# Patient Record
Sex: FEMALE | Race: WHITE
[De-identification: names, ages, dates, MRNs, and addresses within clinical notes are randomized per-mention and may not be internally consistent; named-entity substitution may affect disease eponyms.]

---

## 2017-03-02 NOTE — HISTORY & PHYSICAL EXAMINATION
DATE OF ADMISSION:  2017

 

CHIEF COMPLAINT:  Leakage of fluid.

 

HISTORY OF PRESENT ILLNESS:  The patient is a 26-year-old G1, P0 at 35 weeks

and 3 days of gestation who felt a leakage of fluid from vagina at around

6:00 p.m. tonight and it has been coming as a gush and trickling since then.

The fluid has been clear.  No vaginal bleeding and no contractions, and she 
reports good

fetal movements.  She denies headaches, change in her vision, nausea,

vomiting, epigastric or right upper quadrant pain.  She denies fever, chills,

chest pain, or shortness of breath.  Her pregnancy has been complicated by:

1.  History of PCOS before pregnancy.  She was on metformin until 12 weeks

and then she stopped.

2.  Class 3 obesity.  She had baseline preeclamptic labs which were normal,

growth scan every 6-8 weeks.  Last growth scan was done on 17 and EFW 

was  27OO GRS. She was scheduled for NST b.i.d. after 36 weeks.

3.  Gestational diabetes.  She was started on glyburide; she is on 5 mg daily

now and fingersticks have been stable.

4.  Alcohol consumption during pregnancy.  She had 13 ounces of alcohol

intake around 3 weeks of pregnancy.  She had a fetal echo which was

normal.

 

PAST MEDICAL HISTORY:  As above, history of asthma, history of PCOS, history

of insulin resistance, and history of peanut allergy.

 

PAST SURGICAL HISTORY:  [place] teeth extraction and removal of gallbladder in

.

 

SOCIAL HISTORY:  The patient was a smoker and she quit in May of 2016.  She

used alcohol in first trimester as above.  She denies drug use.  She works as

an EMT in the ambulance.

 

MEDICATIONS:  Prenatal vitamins, glyburide 5 mg daily, calcium 500 mg daily,

Ventolin HFA as needed.

 

ALLERGIES:  AMOXICILLIN CAUSED SYNCOPE, AMPICILLIN CAUSED A RASH, LEVAQUIN 
CAUSED

HIVES AND A RASH, PEANUT OIL CAUSED ANAPHYLAXIS.

 

PRENATAL LABORATORIES:  Her blood type is A positive, antibody screen

negative, rubella positive, RPR nonreactive, hepatitis B surface antigen

negative, HIV negative.  BUN, creatinine, AST, ALT were normal in first

trimester.  H\T\H was 13/39, and 1 hour Glucola was elevated during the first

trimester and 3-hour OGGT was normal.  Repeat H\T\H was 12/35 and a 1 hour

Glucola was elevated and 3-hour OGGT was elevated.  GBS is unknown.

 

PHYSICAL EXAMINATION:

GENERAL:  The patient is alert, oriented x3.  She is comfortably sitting in

the bed.  No complaints.

VITAL SIGNS:  Stable, afebrile.

CARDIOVASCULAR SYSTEM:  S1, S2, RRR.

LUNGS:  Clear to auscultation bilaterally.

ABDOMEN:  Soft, gravid, [place] 6-1/2 to 7 pounds, morbidly obese.

EXTREMITIES:  Nontender, no edema.

PELVIC:  She is grossly ruptured, clear amniotic fluid.  Nitrazine positive. 

Cervix is 4 cm, 50%, minus 3, vertex.  Fetal heart rate 140s, not reactive

yet.  [place], no contractions.

 

ASSESSMENT AND PLAN:  The patient is a 26-year-old G1, P0 at 35 weeks and 3

days of gestation presenting with  premature rupture of membranes. 

Vital signs stable, afebrile.  Fetal heart rate reassuring.  GBS unknown. 

She is in early labor.  Plan is to admit, start IV fluids, IV insulin

management per pharmacy and vancomycin for unknown GBS.  I talked to the

patient about being  and the possible transfer of baby to [facility] where NICU available.  She understands.

 I also talked to the pediatrician on call.  She is aware and she plans to be 
in for delivery.

 

 

 

JOJO

## 2017-03-03 NOTE — ANESTHESIA PROCEDURE NOTE
Anesthesia Epidural Removal Nt


Date & Time


Mar 3, 2017 at 11:24





Vital Signs


Pain Intensity:  0.0





Notes


Mental Status:  alert / awake / arousable, participated in evaluation


Nausea / Vomiting:  adequately controlled


Pain:  adequately controlled


Airway Patency, RR, SpO2:  stable & adequate


BP & HR:  stable & adequate


Hydration State:  stable & adequate


Neuraxial Anesthesia:  was administered, sensory block is resolved


Anesthetic Complications:  no major complications apparent, pt satisfied with 

anesthetic care


Epidural:  removed without complications, with tip intact

## 2017-03-03 NOTE — PROGRESS NOTE
Progress Note


Date of Service


Mar 3, 2017.





Progress Note


Delivery Note


 live female over intact perineum ALIE with Apgars 8/9 birth weight pending.

  Cord blood obtained for cord collection.  Placenta delivered spontaneously 

and intact.  No tears.   ml.  Final sponge and needle instrument count 

are correct.  Mom and baby stable.

## 2017-03-03 NOTE — PROGRESS NOTE
Progress Note


Date of Service


Mar 3, 2017.





Progress Note


Asked to redose epidural for pain. Pt having perineal pain w/ contractions. I 

informed pt that this is sometimes difficult to treat. On exam, pt had level 

around T8 b/l. I sat pt upright and gave 5 mL 0.25% bupivicaine and 100 mcg 

fentanyl in divided doses via epidural catheter from [id number]. Pt now feeling 

"much better". Vitals stable. Fetal heart tones unchanged.

## 2017-03-04 NOTE — OB/GYN PROGRESS NOTE
OB/GYN Progress Note


Date of Service


Mar 4, 2017.





Subjective


conversation w/ patient, physical exam


Ambulation:  ambulating normally


Voiding:  no voiding problems


Passing Gas:  Yes


Diet Tolerance:  Regular Diet


Lochia:  Moderate


Feeding Type:  Breast Feeding





Review of Systems


Constitutional:  No chills, No fatigue, No fever, No problem reported, No sweats

, No weakness, No weight loss


Respiratory:  No cough, No dyspnea at rest, No dyspnea on exertion, No 

hemoptysis, No problem reported, No shortness of breath, No sputum, No wheezing


Cardiac:  No PND, No chest pain, No claudication, No edema, No orthopnea, No 

palpitations, No problem reported


Breast:  No breast lump, No breast pain, No change in shape, No nipple discharge

, No problem reported, No see HPI


Abdomen:  No GI bleeding, No constipation, No diarrhea, No nausea, No pain, No 

problem reported, No vomiting


Female :  No abnormal vaginal bleeding, No dysuria, No hematuria, No 

incontinence, No problem reported, No see HPI, No urinary frequency, No vaginal 

discharge


VD day 31


pt doing well


anticipate disch tomorrow





Objective


Vital Signs











  Date Time  Temp Pulse Resp B/P Pulse Ox O2 Delivery O2 Flow Rate FiO2


 


3/4/17 07:30 36.7 96 18 112/75  Room Air  


 


3/4/17 07:30      Room Air  


 


3/4/17 05:00 37.1 97 18 104/65  Room Air  


 


3/4/17 00:20      Room Air  


 


3/4/17 00:20 36.7 87 18 112/75  Room Air  


 


3/3/17 20:10 36.9 87 20 132/84  Room Air  


 


3/3/17 15:39 36.8 92 16 123/80 98 Room Air  


 


3/3/17 15:30     97 Room Air  


 


3/3/17 15:30 36.4 85 18 127/82 99 Room Air  


 


3/3/17 14:00      Room Air  











Laboratory Results





Last 24 Hours








Test


  3/4/17


06:19


 


Hemoglobin 12.0 g/dL 


 


Hematocrit 34.5 %

## 2017-03-05 NOTE — OB/GYN PROGRESS NOTE
OB/GYN Progress Note


Date of Service


Mar 5, 2017.





Subjective


conversation w/ patient, physical exam


Ambulation:  ambulating normally


Voiding:  no voiding problems


Passing Gas:  Yes


Diet Tolerance:  Regular Diet


Lochia:  Moderate


Feeding Type:  Breast Feeding





Review of Systems


Constitutional:  No chills, No fatigue, No fever, No problem reported, No sweats

, No weakness, No weight loss


Respiratory:  No cough, No dyspnea at rest, No dyspnea on exertion, No 

hemoptysis, No problem reported, No shortness of breath, No sputum, No wheezing


Cardiac:  No PND, No chest pain, No claudication, No edema, No orthopnea, No 

palpitations, No problem reported


Breast:  No breast lump, No breast pain, No change in shape, No nipple discharge

, No problem reported, No see HPI


Abdomen:  No GI bleeding, No constipation, No diarrhea, No nausea, No pain, No 

problem reported, No vomiting


Female :  No abnormal vaginal bleeding, No dysuria, No hematuria, No 

incontinence, No problem reported, No see HPI, No urinary frequency, No vaginal 

discharge


VD day #3


pt doing well


no complaints


d/c home with instructions





Objective


Vital Signs











  Date Time  Temp Pulse Resp B/P Pulse Ox O2 Delivery O2 Flow Rate FiO2


 


3/5/17 00:05 36.3 84 20 128/83  Room Air  


 


3/5/17 00:05      Room Air  


 


3/4/17 16:00 36.8 97 18 115/77  Room Air  


 


3/4/17 16:00      Room Air  


 


3/4/17 07:30 36.7 96 18 112/75  Room Air  


 


3/4/17 07:30      Room Air

## 2017-03-05 NOTE — DISCHARGE INSTRUCTIONS
Discharge Instructions


Admission


Reason for Admission:  Check Rupture





Discharge


Discharge Diagnosis / Problem:  postpartum





Discharge Goals


Goal(s):  Routine recovery after delivery





Activity Recommendations


Activity Limitations:  as noted below





ACTIVITY RECOMMENDATIONS:





* Gradual return to full activity over the next 2-3 weeks.


* No lifting - nothing heavier than baby over the next 2-3 weeks.


* Do not engage in vigorous exercise, sexual activity or sports until cleared 

by 


   your physician.


* Do not drive or operate any motorized equipment until cleared by your 

physician.


* You may shower/bathe daily.








BREAST CARE:





If you are not breast feeding:





*  Wear a supportive bra 24 hours a day for one to two weeks.


*  Avoid stimulating your breasts and nipples as much as possible during the


    first few weeks after delivery.


*  When taking a shower, have the warm water hit your back, not breasts.


*  When your breasts feel full, apply ice packs.  Usually three to four times 


    a day helps ease the discomfort.


*  Take a mild pain medication (Tylenol/Motrin) when you are uncomfortable.





If breast feeding:





*  Use breast milk to lubricate nipples.  Lansinoh cream may be used for sore 

nipples. 


    You do not need to remove cream prior to breast feeding.  If using a 

different 


    brand of cream, check the label for directions regarding removal of cream 

prior 


    to nursing.


*  Wear a supportive bra.


*  If having problems with breasts or breast feeding, call a lactation 

consultant or 


    your health care provider.








EPISIOTOMY CARE:





After delivery, if you have an episiotomy (stitches), the following steps will 


ease discomfort and aid healing.





*   For the first 24 hours after delivery, place ice packs next to your 

episiotomy 


    to help reduce swelling.


*  After the first 24 hour-period, sitz baths, either portable or in the tub, 

are 


    suggested.  A shower with a shower arm sprayed over the episiotomy may 


    be comforting.


*  Clarisa care should be done after each voiding and bowel movement.  Squirt 


    warm water from a plastic bottle over the perineum (region of the body 


    between the anus and urinary opening) and pat dry.


*  Use Dermoplast to ease discomfort.  Shake container.  Spray directly over


    the episiotomy.  


*  Place a Tucks on a clean sanitary pad next to your episiotomy.








OVER THE COUNTER MEDICATION:





*  For discomfort or pain, you may use Acetaminophen (Tylenol), Ibuprofen (Advil

), or 


    Naproxen (Aleve) following the package directions. 


*  For constipation you may use Colace following the package directions.








SPECIAL CARE INSTRUCTIONS:





When you are discharged from the hospital, it is important for you to follow 


the instructions listed below:





*  During the first week at home, you should be able to care for yourself and


    your baby.  In addition, the usual light household activities are 

encouraged.





*  Limit your activities to the way you feel.  Do not try to clean the house or 


    move furniture.  Be sensible.





*  If you actively engage in sports and have done so up until the time of your


   delivery, you may resume these activities as soon as you feel able.  This may


   take up to one month or even longer.  Use good judgment.





*  Continue to take your prenatal vitamins for at least six weeks after the 

birth


    of your baby.





*  Your diet need not be limited unless you were on a special diet before your 


    delivery.  Breast-feeding mothers need around 2500 calories per day and at


    least 64-80 ounces of fluid per day (8 to 10 glasses).





*  You should eat foods from the four major food groups.  Crash diets or fad 

diets


    are to be avoided.  Eating lean meats, fresh fruits and vegetables, low-fat 


    dairy products, high fiber foods and a regular exercise program, will help 

you 


    get back to your pre-pregnancy weight without putting your health at risk.





*  Constipation is sometimes a problem after delivery.  Take a mild laxative as 


    needed.  If breast feeding, Milk of Magnesia is acceptable to use. You may 


    use a suppository or Fleets enema if no episiotomy.





*  A daily shower or tub bath is suggested.  Be sure to thoroughly and gently 


    dry the perineum.





*  A bloody vaginal discharge will usually continue until around four weeks post


    partum.  A small amount of bleeding may continue for as long as six weeks.  


    Vaginal discharge changes from the bright red bleeding after delivery to 

pink 


    then brownish and finally yellowish-pink before becoming white and 

disappearing.





*  Bleeding may increase with activity.  Your first period may come in 4-8 

weeks.


    If you are breast feeding, your period may be delayed even longer.





*  [place] (sex) can begin whenever both you and your partner feel 

comfortable


    and do not have any form of genital infection.  It is recommended that you 

wait


    until after your return postpartum appointment and discuss with your 

physician.


    If you have questions, please talk to your health care practitioner.  A 

condom 


    should be used to prevent infection and pregnancy.





*  Foreplay, gentle intercourse and lubrication is very important the first 

several 


    times to prevent pain.  A water-based lubricant such as K-Y jelly or 

Astroglide 


    may be used.





*  Tampons may be used six weeks after delivery.





*  Douching should be avoided for 6 weeks after delivery.





*  If you have RH negative blood and your baby is RH positive, you will receive 


    RHOGAM by injection prior to discharge.  The nurse will give you a card to 


    keep with you that has the date and place that you received RHOGAM after 


    delivery.





*  During your prenatal care, you had a Rubella screen done to check for the 


    presence of rubella antibodies in your blood.  If your test was negative, 

you


    will receive a Rubella vaccine prior to discharge.  This vaccine may cause 

a 


    fever, soreness at the injection site and flu-like symptoms.  If these 

symptoms


    persist, notify your health care practitioner.   Pregnancy is not advised 

for 


    three months after a Rubella vaccine.  There is a higher chance of having a 


    baby with birth defects if conceived within three months of getting the 

vaccine.





*  If you were discharged 24 hours from delivery or before 48 hours: Visiting 

nurses 


    will come to your home 48 hours after discharge to assess you and your 

baby.  


    The visiting nurse will meet with you while you are in the hospital to 

arrange a 


    time and get directions to your home.





*  Verbalizes understanding of car seat law as reviewed with patient nursing.





*  Car Seat hand-out given and reviewed with patient by nursing.





*  Shaken baby information reviewed with patient by nursing.


 





Call you doctor if:





*  Heavy bleeding (saturating several pads an hour) or passing clots the size 

of 


    your fist.


*  A fever >101 degrees F (38.3 degrees C) on two occasions four hours apart 


    and/or chills.


*  Unusual pain in the pelvic or vaginal areas.


*  "Baby Blues" lasting longer than two weeks.





If you have any questions or concerns, call your health care practitioner 


at [phone].








FOLLOW-UP VISIT:





*  Please call the office at [phone] to schedule a 6 week postpartum


    examination.  It is important you keep this appointment.  


*  It is important for you to make arrangements for either yearly or twice 


    yearly check-ups thereafter.











.





Current Hospital Diet


Patient's current hospital diet: Regular OB Diet





Discharge Diet


Recommended Diet:  Regular Diet





Pending Studies


Studies pending at discharge:  no





Medical Emergencies








.


Who to Call and When:





Medical Emergencies:  If at any time you feel your situation is an emergency, 

please call 911 immediately.





.





Non-Emergent Contact


Non-Emergency issues call your:  Specialist





.


.





"Provider Documentation" section prepared by Bro High.





VTE Core Measure


Inpt VTE Proph given/why not?:  Treatment not indicated

## 2017-07-28 NOTE — EMERGENCY ROOM VISIT NOTE
History


First contact with patient:  14:45


Chief Complaint:  SYNCOPE


Stated Complaint:  SYNCOPE


Nursing Triage Summary:  


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 susana was in a pateints 


house and became "hot, dizzy and lightheaded." patient walked outside and put 


arms on a banister and laid head on arms. "That's the last thing I remember." 

pt 


had syncopal episode ground level. +LOC for approximately 10-15 seconds. 





 prehospital susana received 4mg zofran sublingual 











pt is 4-5 months post partum. patient states she got period on  but period 


was 10 days late. pt unsure if she is pregnant at this time. 











patient states she continues to  feel dizzy when she stands.





History of Present Illness


The patient is a 27 year old female who presents to the Emergency Room via EMS 

with complaints of syncopal episode.  The patient states that she was at the 

scene of an EMS call and she was in a house and got very very hot.  She then 

started feeling nauseated.  She stepped outside and felt lightheaded so she 

leaned against a railing.  The patient states the next thing she knows was 

someone was calling her name.  She was laying on the ground.  A  

that was at the scene stated that they saw her fall backwards.  The patient 

does not know if she hit her head.  The patient does admit to a headache but 

denies any visual changes dizziness.  She still complains of nausea but denies 

any vomiting.  The patient is also complaining of pain at the lower portion of 

her sternum on palpation.  The patient denies any chest pain or shortness of 

breath.  The patient denies any abdominal pain or any neck pain.  The patient 

denies any back pain or extremity pain.  The patient states there is a chance 

she could be pregnant.  She states that urine pregnancy test do not work on 

her.  The patient does admit to passing out on several occasions in the past .  

She states that she has had to vagal syncope episodes when she was 14 and 20.  

She also had a syncopal episode after taking amoxicillin approximately one and 

a half years ago.





Review of Systems


10 system review was performed and was negative unless stated otherwise history 

of present illness.





Past Medical/Surgical History


Medical Problems:


(1) Asthma, Unspecified


(2) CAP (community acquired pneumonia)


(3) CAP (community acquired pneumonia)


(4) Cholelithiasis without obstruction


(5) PCOS (polycystic ovarian syndrome)


(6) Pre-diabetes


(7)  premature rupture of membranes in third trimester


(8) URIC ACID NEPHROLITHIAS


Surgical Problems:


(1) History of cholecystectomy


(2) History of wisdom tooth extraction








Family History





Diabetes mellitus


FH: cancer


FH: diabetes mellitus


FH: gallbladder disease


FH: heart disease


FH: hypertension


FH: kidney disease


FH: lung disease





Social History


Smoking Status:  Former Smoker


Alcohol Use:  occasionally


Drug Use:  none


Marital Status:  in relationship


Housing Status:  lives with family


Occupation Status:  employed





Current/Historical Medications


Scheduled


Metformin Hcl (Glucophage), 500 MG PO DAILY





Allergies


Coded Allergies:  


     Amoxicillin (Verified  Allergy, Severe, ELAVATED BP/PASSED OUT, 3/2/17)


     Levofloxacin (Verified  Allergy, Intermediate, Rash, 3/2/17)


     Ampicillin (Verified  Allergy, Mild, RASH, 3/2/17)


 noted 16


     Sulbactam (Verified  Allergy, Mild, RASH, 3/2/17)


 noted 16


     Peanut (Verified  Allergy, Unknown, ., 3/2/17)





Physical Exam


Vital Signs











  Date Time  Temp Pulse Resp B/P (MAP) Pulse Ox O2 Delivery O2 Flow Rate FiO2


 


17 17:43  95 20 121/63 99 Room Air  


 


17 15:30 36.7 103 18 103/64 97 Room Air  


 


17 15:20  103      


 


17 15:15  94  129/76    





  105  103/66    


 


17 14:29 36.7 99 18 124/73 97 Room Air  











Physical Exam


GENERAL: Obese 27-year-old female appears in no acute distress.


MENTAL Status: Alert and oriented 3.


HEAD: Atraumatic, nontender to palpation.


EYES: PERRLA.  EOMs intact.


EARS: Canals clear.  TMs without hemotympanum


NECK: Supple, no lymphadenopathy noted.  No carotid bruits noted.


LUNGS: Clear auscultation without wheezes rales or rhonchi.


CARDIAC: Regular rate and rhythm without murmur.  Pulses is full and equal 

throughout.


CHEST WALL: The patient is nontender to palpation over the ribs.  She does have 

tenderness palpation over the lower sternum.  No bony abnormality noted.


ABDOMEN: Positive bowel sounds all 4 quadrants.  Soft, nontender to palpation 

without organomegaly or masses.


SPINE: Entire spine nontender to palpation.


MUSCULOSKELETAL: Patient is able to move her arms and legs without difficulty.


NEURO:Cranial nerves two through 12 intact. Cerebellar function intact with 

finger-to-nose. Fine motor intact with alternating finger motions.





Medical Decision & Procedures


ER Provider


Diagnostic Interpretation:


CHEST 2 VIEWS ROUTINE, STERNUM MIN 2 VIEWS





HISTORY:  27 years-old Female syncope 





COMPARISON: Chest radiograph 2016





TECHNIQUE: 2 views of the chest with 3 views of the sternum





FINDINGS: 





CHEST:


Cardiomediastinal and hilar silhouettes are within normal limits. There is no


pneumothorax or pleural effusion. There is resolution of the previously


described nodular right upper lobe airspace opacities.





The bones appear to be intact grossly. Surgical clips in the upper abdomen


suggest prior cholecystectomy.





STERNUM:


On image 3 of 3, there is apparent slight cortical step-off of the inferior


sternum anteriorly near the xiphoid process. The remainder of the sternum


appears unremarkable.





IMPRESSION: 


1. No acute cardiopulmonary process. No pneumothorax.


2. Slight cortical step off of the inferior most sternal cortex anteriorly





CHEST 2 VIEWS ROUTINE, STERNUM MIN 2 VIEWS





HISTORY:  27 years-old Female syncope 





COMPARISON: Chest radiograph 2016





TECHNIQUE: 2 views of the chest with 3 views of the sternum





FINDINGS: 





CHEST:


Cardiomediastinal and hilar silhouettes are within normal limits. There is no


pneumothorax or pleural effusion. There is resolution of the previously


described nodular right upper lobe airspace opacities.





The bones appear to be intact grossly. Surgical clips in the upper abdomen


suggest prior cholecystectomy.





STERNUM:


On image 3 of 3, there is apparent slight cortical step-off of the inferior


sternum anteriorly near the xiphoid process. The remainder of the sternum


appears unremarkable.





IMPRESSION: 


1. No acute cardiopulmonary process. No pneumothorax.


2. Slight cortical step off of the inferior most sternal cortex anteriorly





HEAD WITHOUT CONTRAST (CT)





CLINICAL HISTORY: 27 years-old Female with syncope/headache.       





TECHNIQUE: Multiple axial CT images of the head were obtained without contrast. 


A dose lowering technique was utilized adhering to the principles of ALARA.





CT DOSE:  720.95 mGycm





COMPARISON: 2015.





FINDINGS: 





No acute intracranial hemorrhage, midline shift, mass, large territorial


ischemia or abnormal extra-axial collection. Brain parenchyma appears normal. 





The calvarium is intact.  The paranasal sinuses, mastoid air cells, and middle


ear cavities are clear.





IMPRESSION:  No acute intracranial abnormality.











The above report was generated using voice recognition software. It may contain


grammatical, syntax or spelling errors.





Laboratory Results


17 15:15








Red Blood Count 4.77, Mean Corpuscular Volume 88.1, Mean Corpuscular Hemoglobin 

30.6, Mean Corpuscular Hemoglobin Concent 34.8, Mean Platelet Volume 11.2, 

Neutrophils (%) (Auto) 83.1, Lymphocytes (%) (Auto) 9.6, Monocytes (%) (Auto) 

5.4, Eosinophils (%) (Auto) 1.6, Basophils (%) (Auto) 0.1, Neutrophils # (Auto) 

7.89, Lymphocytes # (Auto) 0.91, Monocytes # (Auto) 0.51, Eosinophils # (Auto) 

0.15, Basophils # (Auto) 0.01





17 15:15

















Test


  17


15:15


 


White Blood Count


  9.49 K/uL


(4.8-10.8)


 


Red Blood Count


  4.77 M/uL


(4.2-5.4)


 


Hemoglobin


  14.6 g/dL


(12.0-16.0)


 


Hematocrit 42.0 % (37-47) 


 


Mean Corpuscular Volume


  88.1 fL


()


 


Mean Corpuscular Hemoglobin


  30.6 pg


(25-34)


 


Mean Corpuscular Hemoglobin


Concent 34.8 g/dl


(32-36)


 


Platelet Count


  219 K/uL


(130-400)


 


Mean Platelet Volume


  11.2 fL


(7.4-10.4)


 


Neutrophils (%) (Auto) 83.1 % 


 


Lymphocytes (%) (Auto) 9.6 % 


 


Monocytes (%) (Auto) 5.4 % 


 


Eosinophils (%) (Auto) 1.6 % 


 


Basophils (%) (Auto) 0.1 % 


 


Neutrophils # (Auto)


  7.89 K/uL


(1.4-6.5)


 


Lymphocytes # (Auto)


  0.91 K/uL


(1.2-3.4)


 


Monocytes # (Auto)


  0.51 K/uL


(0.11-0.59)


 


Eosinophils # (Auto)


  0.15 K/uL


(0-0.5)


 


Basophils # (Auto)


  0.01 K/uL


(0-0.2)


 


RDW Standard Deviation


  38.9 fL


(36.4-46.3)


 


RDW Coefficient of Variation


  12.2 %


(11.5-14.5)


 


Immature Granulocyte % (Auto) 0.2 % 


 


Immature Granulocyte # (Auto)


  0.02 K/uL


(0.00-0.02)


 


Prothrombin Time


  10.2 SECONDS


(9.0-12.0)


 


Prothromb Time International


Ratio 1.0 (0.9-1.1) 


 


 


Activated Partial


Thromboplast Time 22.4 SECONDS


(21.0-31.0)


 


Partial Thromboplastin Ratio 0.9 


 


Anion Gap


  8.0 mmol/L


(3-11)


 


Est Creatinine Clear Calc


Drug Dose 140.0 ml/min 


 


 


Estimated GFR (


American) 96.5 


 


 


Estimated GFR (Non-


American 83.2 


 


 


BUN/Creatinine Ratio 17.9 (10-20) 


 


Calcium Level


  9.2 mg/dl


(8.5-10.1)


 


Human Chorionic Gonadotropin,


Qual NEG (NEG) 


 











Medications Administered











 Medications


  (Trade)  Dose


 Ordered  Sig/Mary


 Route  Start Time


 Stop Time Status Last Admin


Dose Admin


 


 Sodium Chloride  1,000 ml @ 


 999 mls/hr  Q1H1M STAT


 IV  17 14:55


 17 15:55 DC 17 15:21


999 MLS/HR


 


 Ondansetron HCl


  (Zofran Inj)  4 mg  NOW  STAT


 IV  17 14:55


 17 14:59 DC 17 15:20


4 MG


 


 Acetaminophen


  (Tylenol Tab)  1,000 mg  NOW  STAT


 PO  17 14:55


 17 14:59 DC 17 15:21


1,000 MG











ECG


Indication:  syncope


Rhythm:  normal sinus


Findings:  no acute ischemic change





ED Course


The patient was evaluated.  The patient's EMR medication list were reviewed.  

IV access was obtained.  She was placed on a monitor.  EKG was ordered 

interpreted as above without any acute findings..  The patient was given 1 L 

normal saline wide-open.  She was given Tylenol 1 g by mouth for headache and 

Zofran 4 mg IV push for nausea.  CBC and differential, renal profile, coags, 

beta hCG was ordered.  Labs are reviewed and were unremarkable.  The nurse 

started to do the patient's orthostatic vitals.  When she was lying down her 

blood pressure was 129/76.  When she sat the patient up her blood pressure was 

103/66 and the patient felt very dizzy therefore the nurse did not stand her up 

to get standing vitals.  The patient will receive a second liter of saline wide-

open.  CT of the head was ordered and interpreted by the radiologist as above 

any acute findings.  X-ray of the sternum and chest were ordered and 

interpreted by the radiologist and myself as above with a small cortical step-

off consistent with cortical fracture.  The patient was able to get up and walk 

without feeling dizzy after receiving the 2 L of saline.  She still was 

complaining of a headache.  I offered her additional pain medication but she 

declined.  The patient was discharged home in stable condition.





Medical Decision


Differential diagnosis include vasovagal syncope, dehydration, orthostatic 

hypotension, brain neoplasm,, cardiac arrhythmia





Impression





 Primary Impression:  


 Syncope


 Additional Impressions:  


 Orthostatic hypotension


 Sternal fracture





Departure Information


Dispostion


Home / Self-Care





Condition


GOOD





Referrals


No Doctor, Assigned (PCP)





Forms


HOME CARE DOCUMENTATION FORM,                                                 

               IMPORTANT VISIT INFORMATION





Patient Instructions


My [facility], Syncope





Additional Instructions





Stay well hydrated especially when it is very hot and humid.  Follow-up with 

your family doctor next week for reevaluation.  If you have any recurrent 

episodes return to ER immediately.  Tylenol as needed for headache.





Problem Qualifiers








 Primary Impression:  


 Syncope


 Syncope type:  heat syncope  Encounter type:  initial encounter  Qualified 

Codes:  T67.1XXA - Heat syncope, initial encounter


 Additional Impressions:  


 Sternal fracture


 Encounter type:  initial encounter  Sternal location:  body of sternum  

Fracture type:  closed  Qualified Codes:  S22.22XA - Fracture of body of sternum

, initial encounter for closed fracture

## 2017-07-28 NOTE — DIAGNOSTIC IMAGING REPORT
CHEST 2 VIEWS ROUTINE, STERNUM MIN 2 VIEWS



HISTORY:  27 years-old Female syncope 



COMPARISON: Chest radiograph 1/31/2016



TECHNIQUE: 2 views of the chest with 3 views of the sternum



FINDINGS: 



CHEST:

Cardiomediastinal and hilar silhouettes are within normal limits. There is no

pneumothorax or pleural effusion. There is resolution of the previously

described nodular right upper lobe airspace opacities.



The bones appear to be intact grossly. Surgical clips in the upper abdomen

suggest prior cholecystectomy.



STERNUM:

On image 3 of 3, there is apparent slight cortical step-off of the inferior

sternum anteriorly near the xiphoid process. The remainder of the sternum

appears unremarkable.



IMPRESSION: 

1. No acute cardiopulmonary process. No pneumothorax.

2. Slight cortical step off of the inferior most sternal cortex anteriorly

suspicious for acute fracture near the xiphoid process. Correlate with point

tenderness. 







The above report was generated using voice recognition software. It may contain

grammatical, syntax or spelling errors.







Electronically signed by:  García Lamas M.D.

7/28/2017 5:30 PM



Dictated Date/Time:  7/28/2017 5:25 PM

## 2017-07-28 NOTE — DIAGNOSTIC IMAGING REPORT
HEAD WITHOUT CONTRAST (CT)



CLINICAL HISTORY: 27 years-old Female with syncope/headache.       



TECHNIQUE: Multiple axial CT images of the head were obtained without contrast. 

A dose lowering technique was utilized adhering to the principles of ALARA.



CT DOSE:  720.95 mGycm



COMPARISON: 12/19/2015.



FINDINGS: 



No acute intracranial hemorrhage, midline shift, mass, large territorial

ischemia or abnormal extra-axial collection. Brain parenchyma appears normal. 



The calvarium is intact.  The paranasal sinuses, mastoid air cells, and middle

ear cavities are clear.



IMPRESSION:  No acute intracranial abnormality.







The above report was generated using voice recognition software. It may contain

grammatical, syntax or spelling errors.







Electronically signed by:  García Lamas M.D.

7/28/2017 5:02 PM



Dictated Date/Time:  7/28/2017 5:01 PM

## 2017-07-29 NOTE — EMERGENCY ROOM VISIT NOTE
History


Report prepared by Ivis:  Christianne Benavidez


Under the Supervision of:  Dr. Paul Vasquez D.O.


First contact with patient:  17:31


Chief Complaint:  ABDOMINAL PAIN


Stated Complaint:  RIGHT AB PAIN


Nursing Triage Summary:  


 Nausea, vomiting and abd pain (RLQ).  





hx of cholecystectomy





History of Present Illness


The patient is a 27 year old female who presents to the Emergency Room with 

complaints of intermittent abdominal pain starting this morning. She was seen 

in the ED yesterday after an episode of syncope. She did not have any abdominal 

pain at that time. The pain goes from her RLQ to the LUQ. She describes the 

pain as sharp and stabbing. She gets nauseous with the pain. She took Tylenol 

today at 1230 to no significant relief. She denies any back pain, rash, fever, 

or other symptoms. She is unsure if she has hematuria due to being on her 

period. Her current period was 10 days late and abnormal at first. She has a 

history of PCOS and insulin resistance. She has had kidney stones in the past, 

but states that her current pain is dissimilar. She has had a cholecystectomy. 

She still has her appendix.





   Source of History:  patient


   Onset:  this morning


   Position:  abdomen (RLQ, LUQ)


   Quality:  sharp, stabbing


   Timing:  intermittent


   Associated Symptoms:  + nausea, No fevers, No back pain, No rash





Review of Systems


See HPI for pertinent positives & negatives. A total of 10 systems reviewed and 

were otherwise negative.





Past Medical & Surgical


Medical Problems:


(1) Asthma, Unspecified


(2) CAP (community acquired pneumonia)


(3) CAP (community acquired pneumonia)


(4) Cholelithiasis without obstruction


(5) PCOS (polycystic ovarian syndrome)


(6) Pre-diabetes


(7)  premature rupture of membranes in third trimester


(8) URIC ACID NEPHROLITHIAS


Surgical Problems:


(1) History of cholecystectomy


(2) History of wisdom tooth extraction








Family History





Diabetes mellitus


FH: cancer


FH: diabetes mellitus


FH: gallbladder disease


FH: heart disease


FH: hypertension


FH: kidney disease


FH: lung disease





Social History


Smoking Status:  Former Smoker


Alcohol Use:  occasionally


Drug Use:  none


Marital Status:  in relationship


Housing Status:  lives with family


Occupation Status:  employed





Current/Historical Medications


Scheduled


Metformin Hcl (Glucophage), 500 MG PO DAILY





Allergies


Coded Allergies:  


     Amoxicillin (Verified  Allergy, Severe, ELAVATED BP/PASSED OUT, 3/2/17)


     Levofloxacin (Verified  Allergy, Intermediate, Rash, 3/2/17)


     Ampicillin (Verified  Allergy, Mild, RASH, 3/2/17)


 noted 16


     Sulbactam (Verified  Allergy, Mild, RASH, 3/2/17)


 noted 16


     Flu Virus Vaccine (Verified  Allergy, Unknown, Unknown, 17)


     Peanut (Verified  Allergy, Unknown, ., 3/2/17)





Physical Exam


Vital Signs











  Date Time  Temp Pulse Resp B/P (MAP) Pulse Ox O2 Delivery O2 Flow Rate FiO2


 


17 19:38  75 18 129/75 98 Room Air  


 


17 19:12  88      


 


17 18:43  88 16 131/75 99 Room Air  


 


17 16:50 37.0 95 16 111/78 98 Room Air  











Physical Exam


GENERAL:  Patient is awake, alert, and in no acute distress. Patient is resting 

comfortably and showing no signs of anxiety


EYES: The conjunctivae are clear.  The pupils are round and reactive. 


EARS, NOSE, MOUTH AND THROAT: The nose is without any evidence of any 

deformity. Mucous membranes are moist tongue is midline 


NECK: The neck is nontender and supple.


RESPIRATORY: Normal respiratory effort is noted there is no evidence of 

wheezing rhonchi or rales


CARDIOVASCULAR:  Regular rate and rhythm noted there no murmurs rubs or gallops 

normal S1 normal S2 


GASTROINTESTINAL: The abdomen is mildly distended but soft, right sided 

tenderness to palpation, no guarding or rigidity. 


MUSCULOSKELETAL/EXTREMITIES: There is no evidence of gross deformity full range 

of motion is noted in the hips and shoulders


SKIN: There is no obvious evidence of any rash. There are no petechiae, pallor 

or cyanosis noted. 


NEUROLOGIC:  Patient is awake alert and oriented x3





Medical Decision & Procedures


ER Provider


Diagnostic Interpretation:


Radiology results as stated below per my review and radiologist interpretation:





CT OF THE ABDOMEN AND PELVIS WITHOUT CONTRAST





CLINICAL HISTORY: Right-sided abdominal pain.    





COMPARISON STUDY:  CT of the abdomen and pelvis 2012 and renal


ultrasound 2016. 





TECHNIQUE: Axial images of the abdomen and pelvis were obtained without IV


contrast. Images were reviewed in the axial, sagittal, and coronal planes.  A


dose lowering technique was utilized adhering to the principles of ALARA.








FINDINGS: No pneumatosis, free air or portal venous gas is present. There is no


biliary ductal dilatation status post cholecystectomy. There is probable fatty


infiltration of the liver. A splenule is present. Unenhanced images of the


spleen, adrenal glands and pancreas are unremarkable. Mild mesenteric


infiltration is unchanged since prior CT. No renal, ureteral or bladder calculi


are present. There is no hydronephrosis. The caliber of small and large bowel


are normal. The appendix is normal. There is no ascites or lymphadenopathy.


Prominent mesenteric lymph nodes are unchanged. These are benign. No suspicious


skeletal lesions are identified.





IMPRESSION:  





1. No urinary calculi or hydronephrosis.





2. No acute process within the abdomen or pelvis on unenhanced exam. Normal


appendix.





Electronically signed by:  Torrey Mohr M.D.


2017 6:56 PM





Dictated Date/Time:  2017 6:49 PM





Laboratory Results


17 18:23








Red Blood Count 4.04, Mean Corpuscular Volume 88.9, Mean Corpuscular Hemoglobin 

30.9, Mean Corpuscular Hemoglobin Concent 34.8, Mean Platelet Volume 10.7, 

Neutrophils (%) (Auto) 64.1, Lymphocytes (%) (Auto) 27.4, Monocytes (%) (Auto) 

4.8, Eosinophils (%) (Auto) 3.3, Basophils (%) (Auto) 0.2, Neutrophils # (Auto) 

2.69, Lymphocytes # (Auto) 1.15, Monocytes # (Auto) 0.20, Eosinophils # (Auto) 

0.14, Basophils # (Auto) 0.01





17 17:40

















Test


  17


17:30 17


17:40 17


18:23


 


Urine Color YELLOW   


 


Urine Appearance CLEAR (CLEAR)   


 


Urine pH 5.5 (4.5-7.5)   


 


Urine Specific Gravity


  1.014


(1.000-1.030) 


  


 


 


Urine Protein NEG (NEG)   


 


Urine Glucose (UA) NEG (NEG)   


 


Urine Ketones NEG (NEG)   


 


Urine Occult Blood 1+ (NEG)   


 


Urine Nitrite NEG (NEG)   


 


Urine Bilirubin NEG (NEG)   


 


Urine Urobilinogen NEG (NEG)   


 


Urine Leukocyte Esterase TRACE (NEG)   


 


Urine WBC (Auto) 1-5 /hpf (0-5)   


 


Urine RBC (Auto) 0-4 /hpf (0-4)   


 


Urine Hyaline Casts (Auto) 1-5 /lpf (0-5)   


 


Urine Epithelial Cells (Auto)


  20-30 /lpf


(0-5) 


  


 


 


Urine Bacteria (Auto) NEG (NEG)   


 


Anion Gap


  


  3.0 mmol/L


(3-11) 


 


 


Est Creatinine Clear Calc


Drug Dose 


  164.8 ml/min 


  


 


 


Estimated GFR (


American) 


  118.8 


  


 


 


Estimated GFR (Non-


American 


  102.5 


  


 


 


BUN/Creatinine Ratio  15.6 (10-20)  


 


Calcium Level


  


  8.9 mg/dl


(8.5-10.1) 


 


 


Total Bilirubin


  


  0.3 mg/dl


(0.2-1) 


 


 


Direct Bilirubin


  


  < 0.1 mg/dl


(0-0.2) 


 


 


Aspartate Amino Transf


(AST/SGOT) 


  17 U/L (15-37) 


  


 


 


Alanine Aminotransferase


(ALT/SGPT) 


  29 U/L (12-78) 


  


 


 


Alkaline Phosphatase


  


  86 U/L


() 


 


 


Total Protein


  


  7.1 gm/dl


(6.4-8.2) 


 


 


Albumin


  


  3.3 gm/dl


(3.4-5.0) 


 


 


Lipase


  


  176 U/L


() 


 


 


Human Chorionic Gonadotropin,


Qual 


  NEG (NEG) 


  


 


 


White Blood Count


  


  


  4.20 K/uL


(4.8-10.8)


 


Red Blood Count


  


  


  4.04 M/uL


(4.2-5.4)


 


Hemoglobin


  


  


  12.5 g/dL


(12.0-16.0)


 


Hematocrit   35.9 % (37-47) 


 


Mean Corpuscular Volume


  


  


  88.9 fL


()


 


Mean Corpuscular Hemoglobin


  


  


  30.9 pg


(25-34)


 


Mean Corpuscular Hemoglobin


Concent 


  


  34.8 g/dl


(32-36)


 


Platelet Count


  


  


  152 K/uL


(130-400)


 


Mean Platelet Volume


  


  


  10.7 fL


(7.4-10.4)


 


Neutrophils (%) (Auto)   64.1 % 


 


Lymphocytes (%) (Auto)   27.4 % 


 


Monocytes (%) (Auto)   4.8 % 


 


Eosinophils (%) (Auto)   3.3 % 


 


Basophils (%) (Auto)   0.2 % 


 


Neutrophils # (Auto)


  


  


  2.69 K/uL


(1.4-6.5)


 


Lymphocytes # (Auto)


  


  


  1.15 K/uL


(1.2-3.4)


 


Monocytes # (Auto)


  


  


  0.20 K/uL


(0.11-0.59)


 


Eosinophils # (Auto)


  


  


  0.14 K/uL


(0-0.5)


 


Basophils # (Auto)


  


  


  0.01 K/uL


(0-0.2)


 


RDW Standard Deviation


  


  


  39.5 fL


(36.4-46.3)


 


RDW Coefficient of Variation


  


  


  12.2 %


(11.5-14.5)


 


Immature Granulocyte % (Auto)   0.2 % 


 


Immature Granulocyte # (Auto)


  


  


  0.01 K/uL


(0.00-0.02)





Laboratory results per my review.





Medications Administered











 Medications


  (Trade)  Dose


 Ordered  Sig/Mary


 Route  Start Time


 Stop Time Status Last Admin


Dose Admin


 


 Sodium Chloride  1,000 ml @ 


 999 mls/hr  Q1H1M STAT


 IV  17 17:37


 17 18:37 DC 17 18:01


999 MLS/HR


 


 Ondansetron HCl


  (Zofran Inj)  4 mg  NOW  STAT


 IV  17 17:37


 17 17:38 DC 17 18:06


4 MG


 


 Oxycodone HCl


  (Roxicodone


 Immediate Rel 5MG


 Home Pack)  1 homepack  UD  ONCE


 PO  17 19:30


 17 19:31 DC 17 19:35


1 HOMEPACK


 


 Ondansetron HCl


  (ZOFRAN ODT 4MG


 Home Pack)  1 homepack  UD  ONCE


 PO  17 19:30


 17 19:31 DC 17 19:35


1 HOMEPACK











ED Course


1735: The patient was evaluated in room C8. A complete history and physical 

examination were performed. 





1737: Zofran Inj 4 mg IV, NSS 1000 ml @ 999 mls/hr IV. 





4: Upon reevaluation, the patient is resting comfortably. I discussed the 

results and treatment plan with her. She verbalized agreement of the treatment 

plan. She was discharged home. 





0: Ondansetron HCl 1 homepack PO, Oxycodone HCl 1 homepack PO.





Medical Decision


Prior records/ancillary studies reviewed.


Triage Nursing notes reviewed.


Additional history obtained from family.


The patient's history was concerning for abdominal pain. 





Differential diagnosis:


Etiologies such as appendicitis, diverticulitis, PUD, biliary pathology, UTI, 

pancreatitis, obstruction, mesenteric ischemia, aortic pathology, infections, 

inflammatory bowel disease, renal colic, as well as others were entertained. 





The patient is a 27-year-old female who presented to the emergency department 

for evaluation of right-sided abdominal pain. The patient is a history of 

kidney stone. The patient was treated with IV fluids and IV antiemetics. On 

subsequent reevaluation she was feeling much better. I discussed the patient's 

laboratory radiographic studies with her. She was encouraged to rest and avoid 

any strenuous activity. She was also encouraged call her primary care physician 

to schedule a follow-up appointment. Otherwise she was encouraged to return to 

the emergency Department immediately if symptoms change worsen or the need 

arises. I did review the patient's most recent visit which was for a syncopal 

episode within the last couple days.





Medication Reconcilliation


Current Medication List:  was personally reviewed by me





Blood Pressure Screening


Patient's blood pressure:  Normal blood pressure


Blood pressure disposition:  Did not require urgent referral





Impression





 Primary Impression:  


 Right lower quadrant abdominal pain





Scribe Attestation


The scribe's documentation has been prepared under my direction and personally 

reviewed by me in its entirety. I confirm that the note above accurately 

reflects all work, treatment, procedures, and medical decision making performed 

by me.





Departure Information


Dispostion


Home / Self-Care





Referrals


Gavin Sanchez D.O. (PCP)





Forms


HOME CARE DOCUMENTATION FORM,                                                 

               IMPORTANT VISIT INFORMATION





Patient Instructions


ED Abdominal Pain Unkn Cause, My [facility]





Additional Instructions





Call your family  to schedule a follow-up appointment. Drink plenty clear 

liquids. Continue all medications as prescribed. Continue using Motrin and 

Tylenol as directed for mild pain. If he were going to take strong pain 

medication I would recommend an over-the-counter stool softener.

## 2018-02-23 ENCOUNTER — HOSPITAL ENCOUNTER (EMERGENCY)
Dept: HOSPITAL 45 - C.EDB | Age: 28
Discharge: HOME | End: 2018-02-23
Payer: COMMERCIAL

## 2018-02-23 VITALS — OXYGEN SATURATION: 100 % | HEART RATE: 83 BPM | SYSTOLIC BLOOD PRESSURE: 117 MMHG | DIASTOLIC BLOOD PRESSURE: 65 MMHG

## 2018-02-23 VITALS
HEIGHT: 69.02 IN | WEIGHT: 293 LBS | HEIGHT: 69.02 IN | WEIGHT: 293 LBS | BODY MASS INDEX: 43.4 KG/M2 | BODY MASS INDEX: 43.4 KG/M2

## 2018-02-23 VITALS — TEMPERATURE: 98.6 F

## 2018-02-23 DIAGNOSIS — Z84.1: ICD-10-CM

## 2018-02-23 DIAGNOSIS — Z82.49: ICD-10-CM

## 2018-02-23 DIAGNOSIS — Z88.1: ICD-10-CM

## 2018-02-23 DIAGNOSIS — Z80.9: ICD-10-CM

## 2018-02-23 DIAGNOSIS — Z88.0: ICD-10-CM

## 2018-02-23 DIAGNOSIS — Z91.010: ICD-10-CM

## 2018-02-23 DIAGNOSIS — E28.2: ICD-10-CM

## 2018-02-23 DIAGNOSIS — Z88.7: ICD-10-CM

## 2018-02-23 DIAGNOSIS — Z90.49: ICD-10-CM

## 2018-02-23 DIAGNOSIS — Z83.3: ICD-10-CM

## 2018-02-23 DIAGNOSIS — J45.909: ICD-10-CM

## 2018-02-23 DIAGNOSIS — E16.2: Primary | ICD-10-CM

## 2018-02-23 LAB
ALBUMIN SERPL-MCNC: 3.6 GM/DL (ref 3.4–5)
ALP SERPL-CCNC: 88 U/L (ref 45–117)
ALT SERPL-CCNC: 25 U/L (ref 12–78)
AST SERPL-CCNC: 16 U/L (ref 15–37)
BASOPHILS # BLD: 0.02 K/UL (ref 0–0.2)
BASOPHILS NFR BLD: 0.3 %
BUN SERPL-MCNC: 11 MG/DL (ref 7–18)
CALCIUM SERPL-MCNC: 9.3 MG/DL (ref 8.5–10.1)
CO2 SERPL-SCNC: 28 MMOL/L (ref 21–32)
CREAT SERPL-MCNC: 1.12 MG/DL (ref 0.6–1.2)
EOS ABS #: 0.1 K/UL (ref 0–0.5)
EOSINOPHIL NFR BLD AUTO: 220 K/UL (ref 130–400)
GLUCOSE SERPL-MCNC: 92 MG/DL (ref 70–99)
HCT VFR BLD CALC: 39.9 % (ref 37–47)
HGB BLD-MCNC: 13.7 G/DL (ref 12–16)
IG#: 0.02 K/UL (ref 0–0.02)
IMM GRANULOCYTES NFR BLD AUTO: 20.7 %
LIPASE: 153 U/L (ref 73–393)
LYMPHOCYTES # BLD: 1.56 K/UL (ref 1.2–3.4)
MCH RBC QN AUTO: 29.6 PG (ref 25–34)
MCHC RBC AUTO-ENTMCNC: 34.3 G/DL (ref 32–36)
MCV RBC AUTO: 86.2 FL (ref 80–100)
MONO ABS #: 0.55 K/UL (ref 0.11–0.59)
MONOCYTES NFR BLD: 7.3 %
NEUT ABS #: 5.28 K/UL (ref 1.4–6.5)
NEUTROPHILS # BLD AUTO: 1.3 %
NEUTROPHILS NFR BLD AUTO: 70.1 %
PMV BLD AUTO: 10.7 FL (ref 7.4–10.4)
POTASSIUM SERPL-SCNC: 3.6 MMOL/L (ref 3.5–5.1)
PROT SERPL-MCNC: 7.5 GM/DL (ref 6.4–8.2)
RED CELL DISTRIBUTION WIDTH CV: 12.7 % (ref 11.5–14.5)
RED CELL DISTRIBUTION WIDTH SD: 40 FL (ref 36.4–46.3)
SODIUM SERPL-SCNC: 140 MMOL/L (ref 136–145)
WBC # BLD AUTO: 7.53 K/UL (ref 4.8–10.8)

## 2018-02-23 NOTE — EMERGENCY ROOM VISIT NOTE
History


Report prepared by Ivis:  Vale Quiles


Under the Supervision of:  Dr. Yg Sandoval D.O.


First contact with patient:  13:02


Chief Complaint:  HYPOGLYCEMIA


Stated Complaint:  BLOOD SUGAR KEEPS DROPPING, GERMAIN RT SIDE





History of Present Illness


The patient is a 27 year old female who presents to the Emergency Room with 

complaints of intermittent nausea and lightheadedness beginning two weeks ago. 

The patient reports her blood sugar keeps dropping. She states her blood sugar 

level is typically in the 150s but she reports she has been unable to get it 

over 98. The patient was seen in [facility] on Tuesday for the same 

symptoms. While there, she has a UA and pregnancy which was negative.  The 

patient reports a mild right sided headache, and intermittent hot flashes. She 

had an episode of vomiting this morning. She states she has been taking her 

blood sugar every time she get an episode of nausea. The patient used to take 

Metformin but reports she hasn't taken it in about a month. She reports the 

lowest her blood sugar has been over the past two weeks was 63. Pt denies 

headache, change in vision, fevers, chest pain, shortness of breath, diarrhea, 

pain with urination, and melena.





   Source of History:  patient


   Onset:  two weeks ago


   Position:  other (generalized)


   Quality:  other (nausea and lightheadedness)


   Timing:  intermittent


   Associated Symptoms:  + headache, + diaphoresis, + nausea, + vomiting, No 

chest pain, No SOB, No diarrhea, No urinary symptoms





Review of Systems


See HPI for pertinent positives & negatives. A total of 10 systems reviewed and 

were otherwise negative.





Past Medical & Surgical


Medical Problems:


(1) Asthma, Unspecified


(2) CAP (community acquired pneumonia)


(3) CAP (community acquired pneumonia)


(4) Cholelithiasis without obstruction


(5) PCOS (polycystic ovarian syndrome)


(6) Pre-diabetes


(7)  premature rupture of membranes in third trimester


(8) URIC ACID NEPHROLITHIAS


Surgical Problems:


(1) History of cholecystectomy


(2) History of wisdom tooth extraction








Family History





Diabetes mellitus


FH: cancer


FH: diabetes mellitus


FH: gallbladder disease


FH: heart disease


FH: hypertension


FH: kidney disease


FH: lung disease





Social History


Smoking Status:  Never Smoker


Alcohol Use:  occasionally


Drug Use:  none


Marital Status:  in relationship


Housing Status:  lives with family


Occupation Status:  employed





Current/Historical Medications


No Active Prescriptions or Reported Meds





Allergies


Coded Allergies:  


     Amoxicillin (Verified  Allergy, Severe, ELAVATED BP/PASSED OUT, 18)


     Levofloxacin (Verified  Allergy, Intermediate, Rash, 18)


     Ampicillin (Verified  Allergy, Mild, RASH, 18)


 noted 16


     Sulbactam (Verified  Allergy, Mild, RASH, 18)


 noted 16


     Flu Virus Vaccine (Verified  Allergy, Unknown, Unknown, 17)


     Peanut (Verified  Allergy, Unknown, ., 18)





Physical Exam


Vital Signs











  Date Time  Temp Pulse Resp B/P (MAP) Pulse Ox O2 Delivery O2 Flow Rate FiO2


 


18 14:55  83 18 117/65 100 Room Air  


 


18 13:01 37.0 89 20 136/97 99 Room Air  











Physical Exam


GENERAL: Sitting up in bed, alert, well appearing, well nourished, no distress, 

non-toxic 


EYE EXAM: normal conjunctiva. PERRL and EOM's grossly intact.


OROPHARYNX: no exudate, no erythema, lips, buccal mucosa, and tongue normal and 

mucous membranes are moist


NECK: supple, no nuchal rigidity, no adenopathy, non-tender


LUNGS: Clear to auscultation. Normal chest wall mechanics


HEART: no murmurs, S1 normal and S2 normal 


ABDOMEN: abdomen soft, non-tender, normo-active bowel sounds, no masses, no 

rebound or guarding.  


SKIN: no rashes and no bruising 


UPPER EXTREMITIES: upper extremities are grossly normal. 


LOWER EXTREMITIES: No pitting edema.


NEURO EXAM: Normal sensorium, cranial nerves II-XII  intact, normal speech,  no 

weakness of arms, no  weakness of legs. No drift. Finger to nose intact. 

Sensation intact.





Medical Decision & Procedures


Laboratory Results


18 13:20








Red Blood Count 4.63, Mean Corpuscular Volume 86.2, Mean Corpuscular Hemoglobin 

29.6, Mean Corpuscular Hemoglobin Concent 34.3, Mean Platelet Volume 10.7, 

Neutrophils (%) (Auto) 70.1, Lymphocytes (%) (Auto) 20.7, Monocytes (%) (Auto) 

7.3, Eosinophils (%) (Auto) 1.3, Basophils (%) (Auto) 0.3, Neutrophils # (Auto) 

5.28, Lymphocytes # (Auto) 1.56, Monocytes # (Auto) 0.55, Eosinophils # (Auto) 

0.10, Basophils # (Auto) 0.02





18 13:20

















Test


  18


13:20


 


White Blood Count


  7.53 K/uL


(4.8-10.8)


 


Red Blood Count


  4.63 M/uL


(4.2-5.4)


 


Hemoglobin


  13.7 g/dL


(12.0-16.0)


 


Hematocrit 39.9 % (37-47) 


 


Mean Corpuscular Volume


  86.2 fL


()


 


Mean Corpuscular Hemoglobin


  29.6 pg


(25-34)


 


Mean Corpuscular Hemoglobin


Concent 34.3 g/dl


(32-36)


 


Platelet Count


  220 K/uL


(130-400)


 


Mean Platelet Volume


  10.7 fL


(7.4-10.4)


 


Neutrophils (%) (Auto) 70.1 % 


 


Lymphocytes (%) (Auto) 20.7 % 


 


Monocytes (%) (Auto) 7.3 % 


 


Eosinophils (%) (Auto) 1.3 % 


 


Basophils (%) (Auto) 0.3 % 


 


Neutrophils # (Auto)


  5.28 K/uL


(1.4-6.5)


 


Lymphocytes # (Auto)


  1.56 K/uL


(1.2-3.4)


 


Monocytes # (Auto)


  0.55 K/uL


(0.11-0.59)


 


Eosinophils # (Auto)


  0.10 K/uL


(0-0.5)


 


Basophils # (Auto)


  0.02 K/uL


(0-0.2)


 


RDW Standard Deviation


  40.0 fL


(36.4-46.3)


 


RDW Coefficient of Variation


  12.7 %


(11.5-14.5)


 


Immature Granulocyte % (Auto) 0.3 % 


 


Immature Granulocyte # (Auto)


  0.02 K/uL


(0.00-0.02)


 


Urine Color YELLOW 


 


Urine Appearance CLEAR (CLEAR) 


 


Urine pH 5.0 (4.5-7.5) 


 


Urine Specific Gravity


  1.007


(1.000-1.030)


 


Urine Protein 1+ (NEG) 


 


Urine Glucose (UA) NEG (NEG) 


 


Urine Ketones NEG (NEG) 


 


Urine Occult Blood 3+ (NEG) 


 


Urine Nitrite NEG (NEG) 


 


Urine Bilirubin NEG (NEG) 


 


Urine Urobilinogen NEG (NEG) 


 


Urine Leukocyte Esterase NEG (NEG) 


 


Urine WBC (Auto) 1-5 /hpf (0-5) 


 


Urine RBC (Auto) 0-4 /hpf (0-4) 


 


Urine Hyaline Casts (Auto) 0 /lpf (0-5) 


 


Urine Epithelial Cells (Auto) >30 /lpf (0-5) 


 


Urine Bacteria (Auto) NEG (NEG) 


 


Urine Pregnancy Test NEG (NEG) 


 


Anion Gap


  7.0 mmol/L


(3-11)


 


Est Creatinine Clear Calc


Drug Dose 117.8 ml/min 


 


 


Estimated GFR (


American) 79.1 


 


 


Estimated GFR (Non-


American 68.2 


 


 


BUN/Creatinine Ratio 9.8 (10-20) 


 


Calcium Level


  9.3 mg/dl


(8.5-10.1)


 


Total Bilirubin


  0.4 mg/dl


(0.2-1)


 


Direct Bilirubin


  < 0.1 mg/dl


(0-0.2)


 


Aspartate Amino Transf


(AST/SGOT) 16 U/L (15-37) 


 


 


Alanine Aminotransferase


(ALT/SGPT) 25 U/L (12-78) 


 


 


Alkaline Phosphatase


  88 U/L


()


 


Total Protein


  7.5 gm/dl


(6.4-8.2)


 


Albumin


  3.6 gm/dl


(3.4-5.0)


 


Lipase


  153 U/L


()





Laboratory results per my review.





Medications Administered











 Medications


  (Trade)  Dose


 Ordered  Sig/Mary


 Route  Start Time


 Stop Time Status Last Admin


Dose Admin


 


 Sodium Chloride  1,000 ml @ 


 999 mls/hr  Q1H1M STAT


 IV  18 13:09


 18 14:09 DC 18 13:40


999 MLS/HR











ED Course


ED COURSE: 


Vital signs were reviewed and showed normal.


The patients medical record was reviewed


The above diagnostic studies were performed and reviewed.


ED treatments and interventions as stated above. 





1303: The patient was evaluated in room A4B. A complete history and physical 

examination was performed.





1309: Ordered Sodium Chloride 1000 ml @ 999 mls/hr IV. 





1506: I updated the patient on her test results. She is feeling better. 





1515: Upon reevaluation, the patient is resting comfortably.I discussed my 

findings with the patient and she understands and agrees with the treatment 

plan.   


Based on the patients age, coexisting illnesses, exam and lab findings the 

decision to treat as an outpatient was made.


The patient remained stable while under my care.


The patient appeared well at the time of discharge.





Medical Decision


Differential Diagnosis includes but is not limited to dehydration, stroke, 

anemia, hypoglycemia, hyponatremia, hypernatremia, urinary tract infection, 

pneumonia, bronchitis, sepsis, gastroenteritis, additional abdominal pathology, 

metabolic abnormalities and infections.   





Patient is a 27-year-old female who is a diabetic on metformin and no other 

oral or insulins that presents to ER for intermittent episodes over the past 2 

weeks of feeling lightheaded associated with nausea.  She has no other 

complaints.  CBC along with BMP, LFTs, bilirubin and lipase is unremarkable.  

UA was negative.  Pregnancy was negative.  Insulin was unremarkable.  She does 

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  Notes her sugars have been 

fairly consistently in the 90s to low 100s.  She did have one blood sugar of 

60.  She has not been taking metformin for the past several weeks.  I am truly 

uncertain of the cause of her symptoms.  She has no other complaints.  Her 

abdomen was completely benign.  Update the patient at bedside.  She is 

discharged follow-up with PCP as an outpatient.  She was given a bolus of IV 

fluids. Discussed with Pt concerning signs and symptoms to watch out for. Pt 

was instructed to follow up with their PCP and discussed with the patient their 

option to return to the ED at anytime for persistent or worsening symptoms. The 

appropriate anticipatory guidance and out-patient management, including 

indications for return to the emergency department, were explained at length to 

the patient and understood.





Medication Reconcilliation


Current Medication List:  was personally reviewed by me





Blood Pressure Screening


Patient's blood pressure:  Normal blood pressure





Impression





 Primary Impression:  


 Hypoglycemia





Scribe Attestation


The scribe's documentation has been prepared under my direction and personally 

reviewed by me in its entirety. I confirm that the note above accurately 

reflects all work, treatment, procedures, and medical decision making performed 

by me.





Departure Information


Dispostion


Home / Self-Care





Prescriptions





No Active Prescriptions or Reported Meds





Referrals


Wicho Kong D.O. (PCP)





Forms


HOME CARE DOCUMENTATION FORM,                                                 

               IMPORTANT VISIT INFORMATION, WORK / SCHOOL INSTRUCTIONS





Patient Instructions


ED Diabetes Hypoglycemia Oral Agent, Hypoglycemia Steps, My [facility]





Additional Instructions





Please follow up with your primary care doctor with in the next 24 hours.  Any 

worsening of your symptoms, please return to the ED immediately. This includes 

any fevers greater than 100.4, worsening pain, chest pain, shortness breath, 

persistent nausea, vomiting, unable to eat or drink, or any other concerning 

signs or symptoms from your standpoint.








Please try to eat meals which are smaller and more spaced out; more frequently.
